# Patient Record
Sex: FEMALE | URBAN - METROPOLITAN AREA
[De-identification: names, ages, dates, MRNs, and addresses within clinical notes are randomized per-mention and may not be internally consistent; named-entity substitution may affect disease eponyms.]

---

## 2023-04-27 ENCOUNTER — OFFICE VISIT (OUTPATIENT)
Dept: URBAN - METROPOLITAN AREA CLINIC 27 | Facility: CLINIC | Age: 48
End: 2023-04-27
Payer: COMMERCIAL

## 2023-04-27 DIAGNOSIS — E11.3293 TYPE 2 DIAB WITH MILD NONP RTNOP WITHOUT MACULAR EDEMA, BI: Primary | ICD-10-CM

## 2023-04-27 DIAGNOSIS — H25.13 AGE-RELATED NUCLEAR CATARACT, BILATERAL: ICD-10-CM

## 2023-04-27 DIAGNOSIS — H35.3132 NEXDTVE AGE-RELATED MCLR DEGN, BILATERAL, INTERMED DRY STAGE: ICD-10-CM

## 2023-04-27 PROCEDURE — 99204 OFFICE O/P NEW MOD 45 MIN: CPT | Performed by: OPHTHALMOLOGY

## 2023-04-27 PROCEDURE — 92134 CPTRZ OPH DX IMG PST SGM RTA: CPT | Performed by: OPHTHALMOLOGY

## 2023-04-27 ASSESSMENT — INTRAOCULAR PRESSURE
OD: 12
OS: 15

## 2023-04-27 NOTE — IMPRESSION/PLAN
Impression: Type 2 diab with mild nonp rtnop without macular edema, bi: K29.1981.
- since 2016 Plan: Exam and OCT reveal mild non-proliferative diabetic retinopathy without diabetic macular edema (DME). The diagnosis, natural history, and prognosis of diabetic retinopathy were discussed at length. The importance of blood sugar, blood pressure, and cholesterol control and their relationship to progression of diabetic retinopathy were reviewed. Recommend observation today, but discussed need for possible intravitreal injection or laser in the future if there is progression of the diabetic retinopathy. 

RTC 12- 24 months, re-eval, OCT OU

## 2023-04-27 NOTE — IMPRESSION/PLAN
Impression: Nexdtve age-related mclr degn, bilateral, intermed dry stage: H35.3132. Plan: Exam and OCT confirm the presence of RPE changes and drusen consistent with Dry AMD vs old . The diagnosis, natural history, and prognosis of dry AMD were discussed at length. The patient was instructed on the importance of taking AREDS2 vitamins and informed that smoking increases the risk of blindness for this disease. The patient was educated on the proper use of the Amsler grid and encouraged to use it daily to monitor the vision in each eye. The patient was instructed to call our office immediately upon any decreased vision or increased distortion.

## 2023-04-27 NOTE — IMPRESSION/PLAN
Impression: Age-related nuclear cataract, bilateral: H25.13.
+ significant astigmatism Plan: NVS; observe